# Patient Record
Sex: FEMALE | Race: WHITE | NOT HISPANIC OR LATINO | Employment: OTHER | URBAN - METROPOLITAN AREA
[De-identification: names, ages, dates, MRNs, and addresses within clinical notes are randomized per-mention and may not be internally consistent; named-entity substitution may affect disease eponyms.]

---

## 2023-06-08 NOTE — PROGRESS NOTES
Subjective   Elizabet Mitchell is a 77 y.o. female who presents for here for follow-up.  ADI Peñaloza is 77-year-old female with known history of dyslipidemia hypertension elevated BMI patient is here for a follow-up, voices no major medical pain here for fasting blood work, denies chest pain shortness of breath fever chills nausea vomiting constipation diarrhea dysuria urgency frequency. Rib cage pain  Review of Systems  10 system review pertinent as above  Objective     Visit Vitals  BP (!) 138/92   Pulse 78   Temp 37 °C (98.6 °F)   Resp 15      Physical Exam  HEENT: Atraumatic normocephalic the pupils are equal and round and reactive to light the sclerae nonicteric extraocular motion are intact.  Neck: Is supple without JVD no carotid bruits the trachea is midline there are no masses pulses are equal and bilateral with normal upstroke.  Skin: Normal.  Skin good texture.  Moist.  Good turgor.  No lesions, no rashes.  Lymph: No lymphadenopathy appreciated, no masses, no lesions  Lungs: Are clear to auscultation and percussion, good breath sounds bilaterally, no rhonchi, no wheezing, good diaphragmatic excursion.  Heart: Normal rate and normal rhythm S1, S2, no S3, no gallop, murmur or rub.  Abdomen: Soft, nontender, no organomegaly, good bowel sounds.    Extremities: Full range of motion, good pulses bilateral.  No cyanosis, no clubbing or edema.  Neuro: Cranial nerves II-XII are grossly intact there is no sensory or motor deficits.  Able to move all extremities.      Assessment/Plan     Patient is here for follow-up    Fasting blood works    CBC BMP lipids AST ALT vitamin D 25-hydroxy    Declined Mammogram and colonoscopy  06/13/2023    Hypertension elevated this am  Did not take her BP meds  No added salt diet, do not and salt to your food  Try to exercise every other day for 30 minutes  Continue current medications    Continue with the low-fat, low-cholesterol diet,  I recommended Mediterranean diet, which include  fish, chicken, vegetables and olive oil  Exercise daily for 30 minutes at least 3 times a week  Continue home medications    BMI elevated  Diet exercise weight loss    Neuropathy on lyrica   Doing well     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

## 2023-06-13 ENCOUNTER — OFFICE VISIT (OUTPATIENT)
Dept: PRIMARY CARE | Facility: CLINIC | Age: 78
End: 2023-06-13
Payer: MEDICARE

## 2023-06-13 VITALS
HEART RATE: 78 BPM | WEIGHT: 200 LBS | DIASTOLIC BLOOD PRESSURE: 92 MMHG | RESPIRATION RATE: 15 BRPM | TEMPERATURE: 98.6 F | HEIGHT: 60 IN | BODY MASS INDEX: 39.27 KG/M2 | SYSTOLIC BLOOD PRESSURE: 138 MMHG

## 2023-06-13 DIAGNOSIS — M94.0 COSTOCHONDRITIS: ICD-10-CM

## 2023-06-13 DIAGNOSIS — I10 BENIGN ESSENTIAL HTN: Primary | ICD-10-CM

## 2023-06-13 DIAGNOSIS — H26.9 CATARACT, UNSPECIFIED CATARACT TYPE, UNSPECIFIED LATERALITY: ICD-10-CM

## 2023-06-13 DIAGNOSIS — E78.5 DYSLIPIDEMIA: ICD-10-CM

## 2023-06-13 DIAGNOSIS — M16.10 PRIMARY OSTEOARTHRITIS OF HIP, UNSPECIFIED LATERALITY: ICD-10-CM

## 2023-06-13 DIAGNOSIS — Z98.818 OTHER DENTAL PROCEDURE STATUS: ICD-10-CM

## 2023-06-13 PROBLEM — M16.9 OSTEOARTHRITIS OF HIP: Status: ACTIVE | Noted: 2023-06-13

## 2023-06-13 PROCEDURE — 1159F MED LIST DOCD IN RCRD: CPT | Performed by: INTERNAL MEDICINE

## 2023-06-13 PROCEDURE — 1036F TOBACCO NON-USER: CPT | Performed by: INTERNAL MEDICINE

## 2023-06-13 PROCEDURE — 3075F SYST BP GE 130 - 139MM HG: CPT | Performed by: INTERNAL MEDICINE

## 2023-06-13 PROCEDURE — 80048 BASIC METABOLIC PNL TOTAL CA: CPT | Performed by: INTERNAL MEDICINE

## 2023-06-13 PROCEDURE — 80061 LIPID PANEL: CPT | Performed by: INTERNAL MEDICINE

## 2023-06-13 PROCEDURE — 3080F DIAST BP >= 90 MM HG: CPT | Performed by: INTERNAL MEDICINE

## 2023-06-13 PROCEDURE — 99214 OFFICE O/P EST MOD 30 MIN: CPT | Performed by: INTERNAL MEDICINE

## 2023-06-13 RX ORDER — AMOXICILLIN 500 MG/1
500 CAPSULE ORAL DAILY PRN
Qty: 6 CAPSULE | Refills: 1 | Status: SHIPPED | OUTPATIENT
Start: 2023-06-13 | End: 2023-06-25

## 2023-06-13 RX ORDER — TIMOLOL MALEATE 5 MG/ML
SOLUTION/ DROPS OPHTHALMIC
COMMUNITY
Start: 2022-10-09

## 2023-06-13 RX ORDER — LOTEPREDNOL ETABONATE 5 MG/ML
SUSPENSION/ DROPS OPHTHALMIC
COMMUNITY
Start: 2022-04-12 | End: 2023-10-10 | Stop reason: ALTCHOICE

## 2023-06-13 RX ORDER — TIZANIDINE 4 MG/1
TABLET ORAL
COMMUNITY
Start: 2023-01-15

## 2023-06-13 RX ORDER — LOSARTAN POTASSIUM 50 MG/1
1 TABLET ORAL DAILY
COMMUNITY
Start: 2022-07-28 | End: 2023-08-14 | Stop reason: SDUPTHER

## 2023-06-13 RX ORDER — HYDROCHLOROTHIAZIDE 25 MG/1
12.5 TABLET ORAL DAILY
COMMUNITY
End: 2024-05-03 | Stop reason: WASHOUT

## 2023-06-13 RX ORDER — TRAVOPROST OPHTHALMIC SOLUTION 0.04 MG/ML
SOLUTION OPHTHALMIC
COMMUNITY

## 2023-06-13 RX ORDER — PREGABALIN 50 MG/1
CAPSULE ORAL
COMMUNITY
Start: 2023-05-02

## 2023-06-13 ASSESSMENT — PAIN SCALES - GENERAL: PAINLEVEL: 0-NO PAIN

## 2023-06-14 ENCOUNTER — TELEPHONE (OUTPATIENT)
Dept: PRIMARY CARE | Facility: CLINIC | Age: 78
End: 2023-06-14
Payer: MEDICARE

## 2023-06-16 ENCOUNTER — TELEPHONE (OUTPATIENT)
Dept: PRIMARY CARE | Facility: CLINIC | Age: 78
End: 2023-06-16
Payer: MEDICARE

## 2023-08-14 DIAGNOSIS — I10 BENIGN ESSENTIAL HTN: Primary | ICD-10-CM

## 2023-08-14 RX ORDER — LOSARTAN POTASSIUM 50 MG/1
50 TABLET ORAL DAILY
Qty: 90 TABLET | Refills: 1 | Status: SHIPPED | OUTPATIENT
Start: 2023-08-14 | End: 2024-02-09 | Stop reason: SDUPTHER

## 2023-09-08 ENCOUNTER — TELEPHONE (OUTPATIENT)
Dept: PRIMARY CARE | Facility: CLINIC | Age: 78
End: 2023-09-08
Payer: MEDICARE

## 2023-09-08 DIAGNOSIS — M16.10 PRIMARY OSTEOARTHRITIS OF HIP, UNSPECIFIED LATERALITY: Primary | ICD-10-CM

## 2023-09-08 RX ORDER — AMOXICILLIN 500 MG/1
500 CAPSULE ORAL EVERY 8 HOURS SCHEDULED
Qty: 6 CAPSULE | Refills: 0 | Status: SHIPPED | OUTPATIENT
Start: 2023-09-08 | End: 2024-05-14 | Stop reason: SDUPTHER

## 2023-09-08 RX ORDER — AMOXICILLIN 500 MG/1
500 CAPSULE ORAL
COMMUNITY
Start: 2023-07-31 | End: 2023-09-08 | Stop reason: SDUPTHER

## 2023-10-08 NOTE — PROGRESS NOTES
Subjective   Elizabet Mitchell is a 78 y.o. female who presents for here for follow-up last seen June 13, 2023.  ADI Peñaloza is 77-year-old female with known history of dyslipidemia hypertension elevated BMI patient is here for a follow-up, voices no major medical pain here for fasting blood work, denies chest pain shortness of breath fever chills nausea vomiting constipation diarrhea dysuria urgency frequency.  Patient is here for follow-up and fasting blood work, neuropathy rt yodit cage band like patern extremely hypersensitive.   Review of Systems  10 system review pertinent as above  Objective     There were no vitals taken for this visit.     Physical Exam  HEENT: Atraumatic normocephalic the pupils are equal and round and reactive to light the sclerae nonicteric extraocular motion are intact.  Neck: Is supple without JVD no carotid bruits the trachea is midline there are no masses pulses are equal and bilateral with normal upstroke.  Skin: Normal.  Skin good texture.  Moist.  Good turgor.  No lesions, no rashes.  Lymph: No lymphadenopathy appreciated, no masses, no lesions  Lungs: Are clear to auscultation and percussion, good breath sounds bilaterally, no rhonchi, no wheezing, good diaphragmatic excursion.  Heart: Normal rate and normal rhythm S1, S2, no S3, no gallop, murmur or rub.  Abdomen: Soft, nontender, no organomegaly, good bowel sounds.    Extremities: Full range of motion, good pulses bilateral.  No cyanosis, no clubbing or edema.  Neuro: Cranial nerves II-XII are grossly intact there is no sensory or motor deficits.  Able to move all extremities.      Assessment/Plan     Patient is here for follow-up fasting blood work    CBC BMP lipids AST ALT vitamin D 25-hydroxy    Mammogram Declined  Colonoscopy Declined    Flu vaccine Declined  Pneumovax Declined   Patient wishes No other vaccines     Declined Mammogram and colonoscopy  06/13/2023 continues to decline    Hypertension  No added salt diet, do not  and salt to your food  Try to exercise every other day for 30 minutes  Continue current medications    Continue with the low-fat, low-cholesterol diet,  I recommended Mediterranean diet, which include fish, chicken, vegetables and olive oil  Exercise daily for 30 minutes at least 3 times a week  Continue home medications    Elevated BMI  Low-fat, low-cholesterol diet, exercise, daily  Ideal BMI is between 23 and 26 kg/m²  Low carbohydrate diet.    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

## 2023-10-10 ENCOUNTER — OFFICE VISIT (OUTPATIENT)
Dept: PRIMARY CARE | Facility: CLINIC | Age: 78
End: 2023-10-10
Payer: MEDICARE

## 2023-10-10 VITALS — BODY MASS INDEX: 39.27 KG/M2 | HEIGHT: 60 IN | WEIGHT: 200 LBS

## 2023-10-10 DIAGNOSIS — M16.10 PRIMARY OSTEOARTHRITIS OF HIP, UNSPECIFIED LATERALITY: ICD-10-CM

## 2023-10-10 DIAGNOSIS — I10 BENIGN ESSENTIAL HTN: Primary | ICD-10-CM

## 2023-10-10 DIAGNOSIS — B02.9 HERPES ZOSTER WITHOUT COMPLICATION: ICD-10-CM

## 2023-10-10 DIAGNOSIS — E55.9 VITAMIN D INSUFFICIENCY: ICD-10-CM

## 2023-10-10 DIAGNOSIS — E78.5 DYSLIPIDEMIA: ICD-10-CM

## 2023-10-10 DIAGNOSIS — H26.9 CATARACT, UNSPECIFIED CATARACT TYPE, UNSPECIFIED LATERALITY: ICD-10-CM

## 2023-10-10 PROCEDURE — 1036F TOBACCO NON-USER: CPT | Performed by: INTERNAL MEDICINE

## 2023-10-10 PROCEDURE — 84450 TRANSFERASE (AST) (SGOT): CPT | Performed by: INTERNAL MEDICINE

## 2023-10-10 PROCEDURE — 1159F MED LIST DOCD IN RCRD: CPT | Performed by: INTERNAL MEDICINE

## 2023-10-10 PROCEDURE — 80061 LIPID PANEL: CPT | Performed by: INTERNAL MEDICINE

## 2023-10-10 PROCEDURE — 1170F FXNL STATUS ASSESSED: CPT | Performed by: INTERNAL MEDICINE

## 2023-10-10 PROCEDURE — 80048 BASIC METABOLIC PNL TOTAL CA: CPT | Performed by: INTERNAL MEDICINE

## 2023-10-10 PROCEDURE — G0439 PPPS, SUBSEQ VISIT: HCPCS | Performed by: INTERNAL MEDICINE

## 2023-10-10 PROCEDURE — 1126F AMNT PAIN NOTED NONE PRSNT: CPT | Performed by: INTERNAL MEDICINE

## 2023-10-10 PROCEDURE — 82306 VITAMIN D 25 HYDROXY: CPT | Performed by: INTERNAL MEDICINE

## 2023-10-10 PROCEDURE — 84460 ALANINE AMINO (ALT) (SGPT): CPT | Performed by: INTERNAL MEDICINE

## 2023-10-10 PROCEDURE — 85025 COMPLETE CBC W/AUTO DIFF WBC: CPT | Performed by: INTERNAL MEDICINE

## 2023-10-10 RX ORDER — VALACYCLOVIR HYDROCHLORIDE 1 G/1
1000 TABLET, FILM COATED ORAL 2 TIMES DAILY
Qty: 20 TABLET | Refills: 0 | Status: SHIPPED | OUTPATIENT
Start: 2023-10-10 | End: 2023-10-23 | Stop reason: SDUPTHER

## 2023-10-10 ASSESSMENT — ENCOUNTER SYMPTOMS
DEPRESSION: 0
LOSS OF SENSATION IN FEET: 0
OCCASIONAL FEELINGS OF UNSTEADINESS: 0

## 2023-10-12 ASSESSMENT — ACTIVITIES OF DAILY LIVING (ADL)
TAKING MEDICATION: INDEPENDENT
PREPARING MEALS: INDEPENDENT
JUDGMENT_ADEQUATE_SAFELY_COMPLETE_DAILY_ACTIVITIES: YES
GROOMING: INDEPENDENT
PATIENT'S MEMORY ADEQUATE TO SAFELY COMPLETE DAILY ACTIVITIES?: YES
MANAGING FINANCES: INDEPENDENT
EATING: INDEPENDENT
NEEDS ASSISTANCE WITH FOOD: INDEPENDENT
WALKS IN HOME: INDEPENDENT
DRESSING YOURSELF: INDEPENDENT
STIL DRIVING: NO
USING TRANSPORTATION: INDEPENDENT
USING TELEPHONE: INDEPENDENT
DOING HOUSEWORK: INDEPENDENT
ADEQUATE_TO_COMPLETE_ADL: YES
PILL BOX USED: NO
HEARING - RIGHT EAR: FUNCTIONAL
FEEDING YOURSELF: INDEPENDENT
GROCERY SHOPPING: INDEPENDENT
HEARING - LEFT EAR: FUNCTIONAL
BATHING: INDEPENDENT
TOILETING: INDEPENDENT

## 2023-10-12 ASSESSMENT — PATIENT HEALTH QUESTIONNAIRE - PHQ9
1. LITTLE INTEREST OR PLEASURE IN DOING THINGS: NOT AT ALL
2. FEELING DOWN, DEPRESSED OR HOPELESS: NOT AT ALL
SUM OF ALL RESPONSES TO PHQ9 QUESTIONS 1 AND 2: 0

## 2023-10-12 ASSESSMENT — ANXIETY QUESTIONNAIRES
4. TROUBLE RELAXING: NOT AT ALL
7. FEELING AFRAID AS IF SOMETHING AWFUL MIGHT HAPPEN: NOT AT ALL
GAD7 TOTAL SCORE: 3
1. FEELING NERVOUS, ANXIOUS, OR ON EDGE: NOT AT ALL
2. NOT BEING ABLE TO STOP OR CONTROL WORRYING: SEVERAL DAYS
3. WORRYING TOO MUCH ABOUT DIFFERENT THINGS: SEVERAL DAYS
IF YOU CHECKED OFF ANY PROBLEMS ON THIS QUESTIONNAIRE, HOW DIFFICULT HAVE THESE PROBLEMS MADE IT FOR YOU TO DO YOUR WORK, TAKE CARE OF THINGS AT HOME, OR GET ALONG WITH OTHER PEOPLE: SOMEWHAT DIFFICULT
6. BECOMING EASILY ANNOYED OR IRRITABLE: SEVERAL DAYS
5. BEING SO RESTLESS THAT IT IS HARD TO SIT STILL: NOT AT ALL

## 2023-10-12 ASSESSMENT — GERIATRIC MINI NUTRITIONAL ASSESSMENT (MNA)
A HAS FOOD INTAKE DECLINED OVER THE PAST 3 MONTHS DUE TO LOSS OF APPETITE, DIGESTIVE PROBLEMS, CHEWING OR SWALLOWING DIFFICULTIES?: NO DECREASE IN FOOD INTAKE
B WEIGHT LOSS DURING THE LAST 3 MONTHS: NO WEIGHT LOSS
C GENERAL MOBILITY: GOES OUT

## 2023-10-12 ASSESSMENT — COLUMBIA-SUICIDE SEVERITY RATING SCALE - C-SSRS
2. HAVE YOU ACTUALLY HAD ANY THOUGHTS OF KILLING YOURSELF?: NO
6. HAVE YOU EVER DONE ANYTHING, STARTED TO DO ANYTHING, OR PREPARED TO DO ANYTHING TO END YOUR LIFE?: NO
1. IN THE PAST MONTH, HAVE YOU WISHED YOU WERE DEAD OR WISHED YOU COULD GO TO SLEEP AND NOT WAKE UP?: NO

## 2023-10-12 NOTE — PROGRESS NOTES
Subjective   Reason for Visit: Elizabet Mitchell is an 78 y.o. female here for a Medicare Wellness visit.               HPI  Elizabet is a 78 female lives at home independent reports no history of falling takes the medication as prescribed denies fever chills nausea vomiting constipation diarrhea dysuria urgency frequency  Patient Care Team:  Scooby Morales MD as PCP - General  Scooby Morales MD as PCP - [facility] Attributed Provider     Review of Systems  10 system review pertinent as above  Objective   Vitals:  Ht 1.524 m (5')   Wt 90.7 kg (200 lb)   BMI 39.06 kg/m²       Physical Exam  HEENT: Atraumatic normocephalic the pupils are equal and round and reactive to light the sclerae nonicteric extraocular motion are intact.  Neck: Is supple without JVD no carotid bruits the trachea is midline there are no masses pulses are equal and bilateral with normal upstroke.  Skin: Normal.  Skin good texture.  Moist.  Good turgor.  No lesions, no rashes.  Lymph: No lymphadenopathy appreciated, no masses, no lesions  Lungs: Are clear to auscultation and percussion, good breath sounds bilaterally, no rhonchi, no wheezing, good diaphragmatic excursion.  Heart: Normal rate and normal rhythm S1, S2, no S3, no gallop, murmur or rub.  Abdomen: Soft, nontender, no organomegaly, good bowel sounds.    Extremities: Full range of motion, good pulses bilateral.  No cyanosis, no clubbing or edema.  Neuro: Cranial nerves II-XII are grossly intact there is no sensory or motor deficits.  Able to move all extremities.  Assessment/Plan   Medicare wellness  Problem List Items Addressed This Visit       Benign essential HTN - Primary    Relevant Orders    Basic Metabolic Panel (Completed)    Cataract    Osteoarthritis of hip    BMI 39.0-39.9,adult    Relevant Orders    Basic Metabolic Panel (Completed)    Herpes zoster without complication    Relevant Medications    valACYclovir (Valtrex) 1 gram tablet    Other Relevant Orders    CBC (Completed)      Other Visit Diagnoses       Dyslipidemia        Relevant Orders    CBC (Completed)    Lipid Panel (Completed)    AST (Completed)    ALT (Completed)    Vitamin D insufficiency        Relevant Orders    Vitamin D 25-Hydroxy,Total (for eval of Vitamin D levels) (Completed)

## 2023-10-23 ENCOUNTER — TELEPHONE (OUTPATIENT)
Dept: PRIMARY CARE | Facility: CLINIC | Age: 78
End: 2023-10-23
Payer: MEDICARE

## 2023-10-23 DIAGNOSIS — B02.9 HERPES ZOSTER WITHOUT COMPLICATION: ICD-10-CM

## 2023-10-23 RX ORDER — VALACYCLOVIR HYDROCHLORIDE 1 G/1
1000 TABLET, FILM COATED ORAL 2 TIMES DAILY
Qty: 20 TABLET | Refills: 1 | Status: SHIPPED | OUTPATIENT
Start: 2023-10-23 | End: 2023-11-14 | Stop reason: SDUPTHER

## 2023-10-23 NOTE — TELEPHONE ENCOUNTER
Pt called to give an update, the rx for valcyclovir has taken away her pain but she still has the burning on her rib from the shingles

## 2023-11-14 ENCOUNTER — TELEPHONE (OUTPATIENT)
Dept: PRIMARY CARE | Facility: CLINIC | Age: 78
End: 2023-11-14
Payer: MEDICARE

## 2023-11-14 DIAGNOSIS — B02.9 HERPES ZOSTER WITHOUT COMPLICATION: ICD-10-CM

## 2023-11-14 RX ORDER — VALACYCLOVIR HYDROCHLORIDE 1 G/1
1000 TABLET, FILM COATED ORAL 2 TIMES DAILY
Qty: 20 TABLET | Refills: 1 | Status: SHIPPED | OUTPATIENT
Start: 2023-11-14 | End: 2023-12-04

## 2023-11-14 NOTE — TELEPHONE ENCOUNTER
Pt almost completed her vacyclovir rx, she still has burning around her rib cage area, she want to know if she should do another course of valcyclovir.

## 2023-11-30 ENCOUNTER — TELEPHONE (OUTPATIENT)
Dept: PRIMARY CARE | Facility: CLINIC | Age: 78
End: 2023-11-30
Payer: MEDICARE

## 2023-11-30 NOTE — TELEPHONE ENCOUNTER
Has 4 days left of her valcyclovir medication, she states her shingles is getting worse, she has a rash under her breast. Need advice on what to do.

## 2023-12-12 ENCOUNTER — APPOINTMENT (OUTPATIENT)
Dept: PRIMARY CARE | Facility: CLINIC | Age: 78
End: 2023-12-12
Payer: MEDICARE

## 2024-02-09 DIAGNOSIS — I10 BENIGN ESSENTIAL HTN: ICD-10-CM

## 2024-02-09 RX ORDER — LOSARTAN POTASSIUM 50 MG/1
50 TABLET ORAL DAILY
Qty: 90 TABLET | Refills: 1 | Status: SHIPPED | OUTPATIENT
Start: 2024-02-09 | End: 2024-05-03 | Stop reason: ALTCHOICE

## 2024-04-11 ENCOUNTER — APPOINTMENT (OUTPATIENT)
Dept: PRIMARY CARE | Facility: CLINIC | Age: 79
End: 2024-04-11
Payer: MEDICARE

## 2024-04-30 NOTE — PROGRESS NOTES
Subjective   Elizabet Mitchell is a 78 y.o. female who presents for here for follow-up last seen June 13, 2023.  ADI Peñaloza is 77-year-old female with known history of dyslipidemia hypertension elevated BMI patient is here for a follow-up, voices no major medical pain here for fasting blood work, denies chest pain shortness of breath fever chills nausea vomiting constipation diarrhea dysuria urgency frequency.  Patient is here for follow-up and fasting blood work, neuropathy rt yodit cage band like patern extremely hypersensitive.   Review of Systems  10 system review pertinent as above  Objective     Visit Vitals  /82   Pulse 78   Temp 36.4 °C (97.5 °F)   Resp 16        Physical Exam  HEENT: Atraumatic normocephalic the pupils are equal and round and reactive to light the sclerae nonicteric extraocular motion are intact.  Neck: Is supple without JVD no carotid bruits the trachea is midline there are no masses pulses are equal and bilateral with normal upstroke.  Skin: Normal.  Skin good texture.  Moist.  Good turgor.  No lesions, no rashes.  Lymph: No lymphadenopathy appreciated, no masses, no lesions  Lungs: Are clear to auscultation and percussion, good breath sounds bilaterally, no rhonchi, no wheezing, good diaphragmatic excursion.  Heart: Normal rate and normal rhythm S1, S2, no S3, no gallop, murmur or rub.  Abdomen: Soft, nontender, no organomegaly, good bowel sounds.    Extremities: Full range of motion, good pulses bilateral.  No cyanosis, no clubbing or edema.  Neuro: Cranial nerves II-XII are grossly intact there is no sensory or motor deficits.  Able to move all extremities.      Assessment/Plan     Patient is here for follow-up fasting blood work    CBC BMP lipids AST ALT vitamin D 25-hydroxy    Mammogram cont Declined (05/03/24)  Colonoscopy Cont Declined (05/03/24)    Flu vaccine Declined  Pneumovax Declined   Patient wishes No other vaccines     Declined Mammogram and colonoscopy  06/13/2023  continues to decline    Hypertension  No added salt diet, do not and salt to your food  Try to exercise every other day for 30 minutes  Continue current medications    Continue with the low-fat, low-cholesterol diet,  I recommended Mediterranean diet, which include fish, chicken, vegetables and olive oil  Exercise daily for 30 minutes at least 3 times a week  Continue home medications    Elevated BMI  Low-fat, low-cholesterol diet, exercise, daily  Ideal BMI is between 23 and 26 kg/m²  Low carbohydrate diet.    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

## 2024-05-03 ENCOUNTER — OFFICE VISIT (OUTPATIENT)
Dept: PRIMARY CARE | Facility: CLINIC | Age: 79
End: 2024-05-03
Payer: MEDICARE

## 2024-05-03 VITALS
RESPIRATION RATE: 16 BRPM | HEART RATE: 78 BPM | BODY MASS INDEX: 38.09 KG/M2 | HEIGHT: 60 IN | DIASTOLIC BLOOD PRESSURE: 82 MMHG | WEIGHT: 194 LBS | TEMPERATURE: 97.5 F | SYSTOLIC BLOOD PRESSURE: 120 MMHG

## 2024-05-03 DIAGNOSIS — M16.10 PRIMARY OSTEOARTHRITIS OF HIP, UNSPECIFIED LATERALITY: ICD-10-CM

## 2024-05-03 DIAGNOSIS — E66.01 MORBID OBESITY (MULTI): ICD-10-CM

## 2024-05-03 DIAGNOSIS — I10 BENIGN ESSENTIAL HTN: ICD-10-CM

## 2024-05-03 DIAGNOSIS — E78.5 DYSLIPIDEMIA: ICD-10-CM

## 2024-05-03 DIAGNOSIS — Z00.00 ROUTINE GENERAL MEDICAL EXAMINATION AT HEALTH CARE FACILITY: Primary | ICD-10-CM

## 2024-05-03 DIAGNOSIS — I10 PRIMARY HYPERTENSION: ICD-10-CM

## 2024-05-03 PROCEDURE — 1126F AMNT PAIN NOTED NONE PRSNT: CPT | Performed by: INTERNAL MEDICINE

## 2024-05-03 PROCEDURE — 3074F SYST BP LT 130 MM HG: CPT | Performed by: INTERNAL MEDICINE

## 2024-05-03 PROCEDURE — 1036F TOBACCO NON-USER: CPT | Performed by: INTERNAL MEDICINE

## 2024-05-03 PROCEDURE — 80048 BASIC METABOLIC PNL TOTAL CA: CPT | Performed by: INTERNAL MEDICINE

## 2024-05-03 PROCEDURE — 1159F MED LIST DOCD IN RCRD: CPT | Performed by: INTERNAL MEDICINE

## 2024-05-03 PROCEDURE — 80061 LIPID PANEL: CPT | Performed by: INTERNAL MEDICINE

## 2024-05-03 PROCEDURE — 3079F DIAST BP 80-89 MM HG: CPT | Performed by: INTERNAL MEDICINE

## 2024-05-03 PROCEDURE — 84460 ALANINE AMINO (ALT) (SGPT): CPT | Performed by: INTERNAL MEDICINE

## 2024-05-03 PROCEDURE — 85025 COMPLETE CBC W/AUTO DIFF WBC: CPT | Performed by: INTERNAL MEDICINE

## 2024-05-03 PROCEDURE — 99214 OFFICE O/P EST MOD 30 MIN: CPT | Performed by: INTERNAL MEDICINE

## 2024-05-03 PROCEDURE — 84450 TRANSFERASE (AST) (SGOT): CPT | Performed by: INTERNAL MEDICINE

## 2024-05-03 PROCEDURE — 1170F FXNL STATUS ASSESSED: CPT | Performed by: INTERNAL MEDICINE

## 2024-05-03 RX ORDER — LOSARTAN POTASSIUM AND HYDROCHLOROTHIAZIDE 12.5; 5 MG/1; MG/1
1 TABLET ORAL DAILY
Qty: 90 TABLET | Refills: 3 | Status: SHIPPED | OUTPATIENT
Start: 2024-05-03 | End: 2025-05-03

## 2024-05-03 ASSESSMENT — ANXIETY QUESTIONNAIRES
7. FEELING AFRAID AS IF SOMETHING AWFUL MIGHT HAPPEN: NOT AT ALL
2. NOT BEING ABLE TO STOP OR CONTROL WORRYING: NOT AT ALL
4. TROUBLE RELAXING: NOT AT ALL
3. WORRYING TOO MUCH ABOUT DIFFERENT THINGS: NOT AT ALL
GAD7 TOTAL SCORE: 0
6. BECOMING EASILY ANNOYED OR IRRITABLE: NOT AT ALL
1. FEELING NERVOUS, ANXIOUS, OR ON EDGE: NOT AT ALL
IF YOU CHECKED OFF ANY PROBLEMS ON THIS QUESTIONNAIRE, HOW DIFFICULT HAVE THESE PROBLEMS MADE IT FOR YOU TO DO YOUR WORK, TAKE CARE OF THINGS AT HOME, OR GET ALONG WITH OTHER PEOPLE: NOT DIFFICULT AT ALL
5. BEING SO RESTLESS THAT IT IS HARD TO SIT STILL: NOT AT ALL

## 2024-05-03 ASSESSMENT — ACTIVITIES OF DAILY LIVING (ADL)
MANAGING FINANCES: INDEPENDENT
PREPARING MEALS: INDEPENDENT
DOING HOUSEWORK: INDEPENDENT
TOILETING: INDEPENDENT
WALKS IN HOME: INDEPENDENT
HEARING - RIGHT EAR: FUNCTIONAL
NEEDS ASSISTANCE WITH FOOD: INDEPENDENT
DRESSING YOURSELF: INDEPENDENT
USING TELEPHONE: INDEPENDENT
TAKING MEDICATION: INDEPENDENT
STIL DRIVING: YES
USING TRANSPORTATION: INDEPENDENT
HEARING - LEFT EAR: FUNCTIONAL
BATHING: INDEPENDENT
GROOMING: INDEPENDENT
PATIENT'S MEMORY ADEQUATE TO SAFELY COMPLETE DAILY ACTIVITIES?: YES
FEEDING YOURSELF: INDEPENDENT
GROCERY SHOPPING: INDEPENDENT
JUDGMENT_ADEQUATE_SAFELY_COMPLETE_DAILY_ACTIVITIES: YES
PILL BOX USED: NO
EATING: INDEPENDENT
ADEQUATE_TO_COMPLETE_ADL: YES

## 2024-05-03 ASSESSMENT — PATIENT HEALTH QUESTIONNAIRE - PHQ9
SUM OF ALL RESPONSES TO PHQ9 QUESTIONS 1 AND 2: 0
1. LITTLE INTEREST OR PLEASURE IN DOING THINGS: NOT AT ALL
2. FEELING DOWN, DEPRESSED OR HOPELESS: NOT AT ALL

## 2024-05-03 ASSESSMENT — ENCOUNTER SYMPTOMS
OCCASIONAL FEELINGS OF UNSTEADINESS: 0
DEPRESSION: 0
LOSS OF SENSATION IN FEET: 0

## 2024-05-03 ASSESSMENT — GERIATRIC MINI NUTRITIONAL ASSESSMENT (MNA)
B WEIGHT LOSS DURING THE LAST 3 MONTHS: NO WEIGHT LOSS
E NEUROPSYCHOLOGICAL PROBLEMS: NO PSYCHOLOGICAL PROBLEMS
C GENERAL MOBILITY: GOES OUT

## 2024-05-03 ASSESSMENT — COLUMBIA-SUICIDE SEVERITY RATING SCALE - C-SSRS
6. HAVE YOU EVER DONE ANYTHING, STARTED TO DO ANYTHING, OR PREPARED TO DO ANYTHING TO END YOUR LIFE?: NO
2. HAVE YOU ACTUALLY HAD ANY THOUGHTS OF KILLING YOURSELF?: NO
1. IN THE PAST MONTH, HAVE YOU WISHED YOU WERE DEAD OR WISHED YOU COULD GO TO SLEEP AND NOT WAKE UP?: NO

## 2024-05-03 ASSESSMENT — PAIN SCALES - GENERAL: PAINLEVEL: 0-NO PAIN

## 2024-05-03 NOTE — PROGRESS NOTES
Subjective   Reason for Visit: Elizabet Mitchell is an 78 y.o. female here for a Medicare Wellness visit.          Reviewed all medications by prescribing practitioner or clinical pharmacist (such as prescriptions, OTCs, herbal therapies and supplements) and documented in the medical record.    HPI    Patient Care Team:  Scooby Morales MD as PCP - General  Scooby Morales MD as PCP - [facility]     Review of Systems    Objective   Vitals:  /82   Pulse 78   Temp 36.4 °C (97.5 °F)   Resp 16   Ht 1.524 m (5')   Wt 88 kg (194 lb)   BMI 37.89 kg/m²       Physical Exam    Assessment/Plan   Problem List Items Addressed This Visit     Benign essential HTN    Relevant Medications    losartan-hydrochlorothiazide (Hyzaar) 50-12.5 mg tablet    Osteoarthritis of hip    Relevant Orders    CBC    BMI 39.0-39.9,adult    HTN (hypertension)    Relevant Orders    CBC    Basic Metabolic Panel    Morbid obesity (Multi)   Other Visit Diagnoses     Routine general medical examination at health care facility    -  Primary    Dyslipidemia        Relevant Orders    Lipid Panel    AST    ALT

## 2024-05-03 NOTE — PROGRESS NOTES
Subjective   Reason for Visit: Elizabet Mitchell is an 78 y.o. female here for a Medicare Wellness visit. Reviewed all medications by prescribing practitioner or clinical pharmacist (such as prescriptions, OTCs, herbal therapies and supplements) and documented in the medical record.    HPI  Elizabet is 78 she is self-sufficient lives at home reports no falling she follows with pain management from time to time post cervicalgia has been doing well for the last 2 years denies fever chills cough nausea vomiting reports no falling.  Patient Care Team:  Scooby Morales MD as PCP - General  Scooby Morales MD as PCP - [facility] Attributed Provider     Review of Systems  10 system are pertinent as above  Objective   Vitals:  /82   Pulse 78   Temp 36.4 °C (97.5 °F)   Resp 16   Ht 1.524 m (5')   Wt 88 kg (194 lb)   BMI 37.89 kg/m²       Physical Exam  HEENT: Atraumatic normocephalic the pupils are equal and round and reactive to light the sclerae nonicteric extraocular motion are intact.  Neck: Is supple without JVD no carotid bruits the trachea is midline there are no masses pulses are equal and bilateral with normal upstroke.  Skin: Normal.  Skin good texture.  Moist.  Good turgor.  No lesions, no rashes.  Lymph: No lymphadenopathy appreciated, no masses, no lesions  Lungs: Are clear to auscultation and percussion, good breath sounds bilaterally, no rhonchi, no wheezing, good diaphragmatic excursion.  Heart: Normal rate and normal rhythm S1, S2, no S3, no gallop, murmur or rub.  Abdomen: Soft, nontender, no organomegaly, good bowel sounds.    Extremities: Full range of motion, good pulses bilateral.  No cyanosis, no clubbing or edema.  Neuro: Cranial nerves II-XII are grossly intact there is no sensory or motor deficits.  Able to move all extremities.  Assessment/Plan   Medicare wellness  Problem List Items Addressed This Visit       Benign essential HTN    Relevant Medications    losartan-hydrochlorothiazide (Hyzaar)  50-12.5 mg tablet    Osteoarthritis of hip    Relevant Orders    CBC    BMI 39.0-39.9,adult    HTN (hypertension) - Primary    Relevant Orders    CBC    Basic Metabolic Panel    Morbid obesity (Multi)     Other Visit Diagnoses       Dyslipidemia        Relevant Orders    Lipid Panel    AST    ALT

## 2024-05-14 ENCOUNTER — TELEPHONE (OUTPATIENT)
Dept: PRIMARY CARE | Facility: CLINIC | Age: 79
End: 2024-05-14
Payer: MEDICARE

## 2024-05-14 DIAGNOSIS — M16.10 PRIMARY OSTEOARTHRITIS OF HIP, UNSPECIFIED LATERALITY: ICD-10-CM

## 2024-05-14 RX ORDER — AMOXICILLIN 500 MG/1
500 CAPSULE ORAL EVERY 8 HOURS SCHEDULED
Qty: 6 CAPSULE | Refills: 0 | Status: SHIPPED | OUTPATIENT
Start: 2024-05-14

## 2024-05-14 NOTE — TELEPHONE ENCOUNTER
Pt is requesting a rx for an antibiotic, she states she need it before her dentist appointment, please send rx to [facility]

## 2024-07-29 ENCOUNTER — TELEPHONE (OUTPATIENT)
Dept: PRIMARY CARE | Facility: CLINIC | Age: 79
End: 2024-07-29
Payer: MEDICARE

## 2024-07-29 DIAGNOSIS — M16.10 PRIMARY OSTEOARTHRITIS OF HIP, UNSPECIFIED LATERALITY: ICD-10-CM

## 2024-07-29 RX ORDER — AMOXICILLIN 500 MG/1
500 CAPSULE ORAL EVERY 8 HOURS SCHEDULED
Qty: 6 CAPSULE | Refills: 0 | Status: SHIPPED | OUTPATIENT
Start: 2024-07-29

## 2024-08-16 ENCOUNTER — TELEPHONE (OUTPATIENT)
Dept: PRIMARY CARE | Facility: CLINIC | Age: 79
End: 2024-08-16
Payer: MEDICARE

## 2024-11-07 ENCOUNTER — APPOINTMENT (OUTPATIENT)
Dept: PRIMARY CARE | Facility: CLINIC | Age: 79
End: 2024-11-07
Payer: MEDICARE

## 2024-11-21 NOTE — PROGRESS NOTES
Subjective   Elizabet Mitchell is a 79 y.o. female who presents for here for follow-up and Medicare wellness.  HPI  Jh is 77-year-old female with known history of dyslipidemia hypertension elevated BMI patient is here for a follow-up, voices no major medical pain here for fasting blood work, denies chest pain shortness of breath fever chills nausea vomiting constipation diarrhea dysuria urgency frequency.  Patient is here for follow-up and fasting blood work, neuropathy rt yodit cage band like patern extremely hypersensitive.   Review of Systems  10 system review pertinent as above  Objective     Visit Vitals  /82   Pulse 78   Temp 36.7 °C (98.1 °F) (Temporal)   Resp 14          Physical Exam  HEENT: Atraumatic normocephalic the pupils are equal and round and reactive to light the sclerae nonicteric extraocular motion are intact.  Neck: Is supple without JVD no carotid bruits the trachea is midline there are no masses pulses are equal and bilateral with normal upstroke.  Skin: Normal.  Skin good texture.  Moist.  Good turgor.  No lesions, no rashes.  Lymph: No lymphadenopathy appreciated, no masses, no lesions  Lungs: Are clear to auscultation and percussion, good breath sounds bilaterally, no rhonchi, no wheezing, good diaphragmatic excursion.  Heart: Normal rate and normal rhythm S1, S2, no S3, no gallop, murmur or rub.  Abdomen: Soft, nontender, no organomegaly, good bowel sounds.    Extremities: Full range of motion, good pulses bilateral.  No cyanosis, no clubbing or edema.  Neuro: Cranial nerves II-XII are grossly intact there is no sensory or motor deficits.  Able to move all extremities.      Assessment/Plan     Patient is here for follow-up fasting blood work    CBC BMP lipids AST ALT vitamin D 25-hydroxy    Prevention  Mammogram cont Declined (05/03/24 (11/26/24))  Colonoscopy Cont Declined (05/03/24)(11/26/24)    Immunization  Flu vaccine Declined  Pneumovax 11/26/24   Patient wishes No other  vaccines   Craig summer 02/2023  Shingles declined    One dark mole circular and symmetric rt upper arm  Will refer to dermatology  Patient wants to see her own dermatologist Dr. Shannon    Hypertension  No added salt diet, do not and salt to your food  Try to exercise every other day for 30 minutes  Continue current medications    Continue with the low-fat, low-cholesterol diet,  I recommended Mediterranean diet, which include fish, chicken, vegetables and olive oil  Exercise daily for 30 minutes at least 3 times a week  Continue home medications    Elevated BMI 38.67 kg/meter sq  Low-fat, low-cholesterol diet, exercise, daily  Ideal BMI is between 23 and 26 kg/m²  Low carbohydrate diet.    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  Exercise range of motion strengthening      Problem List Items Addressed This Visit       Benign essential HTN - Primary       Orders:    Basic Metabolic Panel           Relevant Orders    Basic Metabolic Panel    Osteoarthritis of hip    Relevant Medications    amoxicillin (Amoxil) 500 mg capsule    BMI 39.0-39.9,adult       Orders:    CBC w/5 Part Differential, [facility]           Relevant Orders    CBC w/5 Part Differential, [facility]    HTN (hypertension)                   Morbid obesity (Multi)       Orders:    CBC w/5 Part Differential, [facility]           Relevant Orders    CBC w/5 Part Differential, [facility]    Class 2 obesity due to excess calories without serious comorbidity with body mass index (BMI) of 39.0 to 39.9 in adult       Orders:    CBC w/5 Part Differential, [facility]           Relevant Orders    CBC w/5 Part Differential, [facility]     Other Visit Diagnoses       Dyslipidemia        Relevant Orders    Lipid Panel    AST    ALT    Change in mole        Relevant Orders    Referral to Dermatology              Scooby  MD Carmen

## 2024-11-26 ENCOUNTER — TELEPHONE (OUTPATIENT)
Dept: PRIMARY CARE | Facility: CLINIC | Age: 79
End: 2024-11-26

## 2024-11-26 ENCOUNTER — APPOINTMENT (OUTPATIENT)
Dept: PRIMARY CARE | Facility: CLINIC | Age: 79
End: 2024-11-26
Payer: MEDICARE

## 2024-11-26 VITALS
DIASTOLIC BLOOD PRESSURE: 82 MMHG | HEART RATE: 78 BPM | HEIGHT: 60 IN | SYSTOLIC BLOOD PRESSURE: 128 MMHG | WEIGHT: 198 LBS | RESPIRATION RATE: 14 BRPM | TEMPERATURE: 98.1 F | BODY MASS INDEX: 38.87 KG/M2

## 2024-11-26 DIAGNOSIS — M16.10 PRIMARY OSTEOARTHRITIS OF HIP, UNSPECIFIED LATERALITY: ICD-10-CM

## 2024-11-26 DIAGNOSIS — E78.5 DYSLIPIDEMIA: ICD-10-CM

## 2024-11-26 DIAGNOSIS — E66.01 MORBID OBESITY (MULTI): ICD-10-CM

## 2024-11-26 DIAGNOSIS — I10 BENIGN ESSENTIAL HTN: Primary | ICD-10-CM

## 2024-11-26 DIAGNOSIS — I10 PRIMARY HYPERTENSION: ICD-10-CM

## 2024-11-26 DIAGNOSIS — D22.9 CHANGE IN MOLE: ICD-10-CM

## 2024-11-26 DIAGNOSIS — E66.09 CLASS 2 OBESITY DUE TO EXCESS CALORIES WITHOUT SERIOUS COMORBIDITY WITH BODY MASS INDEX (BMI) OF 39.0 TO 39.9 IN ADULT: ICD-10-CM

## 2024-11-26 DIAGNOSIS — E66.812 CLASS 2 OBESITY DUE TO EXCESS CALORIES WITHOUT SERIOUS COMORBIDITY WITH BODY MASS INDEX (BMI) OF 39.0 TO 39.9 IN ADULT: ICD-10-CM

## 2024-11-26 LAB
ALT SERPL W P-5'-P-CCNC: 23 U/L (ref 16–63)
ANION GAP SERPL CALC-SCNC: 16 MMOL/L (ref 10–20)
AST SERPL W P-5'-P-CCNC: 25 U/L (ref 15–37)
BASOPHILS # BLD AUTO: 0.03 X10*3/UL (ref 0.1–1.6)
BASOPHILS NFR BLD AUTO: 0.34 % (ref 0–0.3)
BUN SERPL-MCNC: 33 MG/DL (ref 7–18)
CALCIUM SERPL-MCNC: 9.6 MG/DL (ref 8.5–10.1)
CHLORIDE SERPL-SCNC: 101 MMOL/L (ref 98–107)
CHOLEST SERPL-MCNC: 180 MG/DL (ref 0–199)
CHOLESTEROL/HDL RATIO: 2.7 (ref 4.2–7)
CO2 SERPL-SCNC: 25 MMOL/L (ref 21–32)
CREAT SERPL-MCNC: 1.7 MG/DL (ref 0.6–1.1)
EGFRCR SERPLBLD CKD-EPI 2021: 31 ML/MIN/1.73M*2
EOSINOPHIL # BLD AUTO: 0.19 X10*3/UL (ref 0.04–0.5)
EOSINOPHIL NFR BLD AUTO: 1.97 % (ref 0.7–7)
ERYTHROCYTE [DISTWIDTH] IN BLOOD BY AUTOMATED COUNT: 13.8 % (ref 11.5–14.5)
GLUCOSE SERPL-MCNC: 92 MG/DL (ref 74–100)
HCT VFR BLD AUTO: 42.3 % (ref 36.6–46.6)
HDLC SERPL-MCNC: 66 MG/DL (ref 40–59)
HGB BLD-MCNC: 14.36 G/DL (ref 12–15.4)
IS PATIENT FASTING: YES
LDLC SERPL DIRECT ASSAY-MCNC: 105 MG/DL (ref 0–100)
LYMPHOCYTES # BLD AUTO: 2.14 X10*3/UL (ref 0–6)
LYMPHOCYTES NFR BLD AUTO: 21.87 % (ref 20.5–51.1)
MCH RBC QN AUTO: 31.3 PG (ref 26–32)
MCHC RBC AUTO-ENTMCNC: 33.9 G/DL (ref 31–38)
MCV RBC AUTO: 92.2 FL (ref 80–96)
MONOCYTES # BLD AUTO: 0.83 X10*3/UL (ref 1.6–24.9)
MONOCYTES NFR BLD AUTO: 8.47 % (ref 1.7–9.3)
NEUTROPHILS # BLD AUTO: 6.58 X10*3/UL (ref 1.4–6.5)
NEUTROPHILS NFR BLD AUTO: 67.35 % (ref 42.2–75.2)
PLATELET # BLD AUTO: 381.7 X10*3/UL (ref 150–450)
PMV BLD AUTO: 7.83 FL (ref 7.8–11)
POTASSIUM SERPL-SCNC: 5.1 MMOL/L (ref 3.5–5.1)
RBC # BLD AUTO: 4.59 X10*6/UL (ref 3.9–5.3)
SODIUM SERPL-SCNC: 137 MMOL/L (ref 136–145)
TRIGL SERPL-MCNC: 82 MG/DL
WBC # BLD AUTO: 9.77 X10*3/UL (ref 4.5–10.5)

## 2024-11-26 PROCEDURE — G0446 INTENS BEHAVE THER CARDIO DX: HCPCS | Performed by: INTERNAL MEDICINE

## 2024-11-26 PROCEDURE — 3079F DIAST BP 80-89 MM HG: CPT | Performed by: INTERNAL MEDICINE

## 2024-11-26 PROCEDURE — 84460 ALANINE AMINO (ALT) (SGPT): CPT | Performed by: INTERNAL MEDICINE

## 2024-11-26 PROCEDURE — 80061 LIPID PANEL: CPT | Performed by: INTERNAL MEDICINE

## 2024-11-26 PROCEDURE — 85025 COMPLETE CBC W/AUTO DIFF WBC: CPT | Performed by: INTERNAL MEDICINE

## 2024-11-26 PROCEDURE — 1125F AMNT PAIN NOTED PAIN PRSNT: CPT | Performed by: INTERNAL MEDICINE

## 2024-11-26 PROCEDURE — G0447 BEHAVIOR COUNSEL OBESITY 15M: HCPCS | Performed by: INTERNAL MEDICINE

## 2024-11-26 PROCEDURE — 3074F SYST BP LT 130 MM HG: CPT | Performed by: INTERNAL MEDICINE

## 2024-11-26 PROCEDURE — G0442 ANNUAL ALCOHOL SCREEN 15 MIN: HCPCS | Performed by: INTERNAL MEDICINE

## 2024-11-26 PROCEDURE — G0009 ADMIN PNEUMOCOCCAL VACCINE: HCPCS | Performed by: INTERNAL MEDICINE

## 2024-11-26 PROCEDURE — 99214 OFFICE O/P EST MOD 30 MIN: CPT | Performed by: INTERNAL MEDICINE

## 2024-11-26 PROCEDURE — 1170F FXNL STATUS ASSESSED: CPT | Performed by: INTERNAL MEDICINE

## 2024-11-26 PROCEDURE — 1159F MED LIST DOCD IN RCRD: CPT | Performed by: INTERNAL MEDICINE

## 2024-11-26 PROCEDURE — 90677 PCV20 VACCINE IM: CPT | Performed by: INTERNAL MEDICINE

## 2024-11-26 PROCEDURE — 99497 ADVNCD CARE PLAN 30 MIN: CPT | Performed by: INTERNAL MEDICINE

## 2024-11-26 PROCEDURE — G2211 COMPLEX E/M VISIT ADD ON: HCPCS | Performed by: INTERNAL MEDICINE

## 2024-11-26 PROCEDURE — 80048 BASIC METABOLIC PNL TOTAL CA: CPT | Performed by: INTERNAL MEDICINE

## 2024-11-26 PROCEDURE — G0444 DEPRESSION SCREEN ANNUAL: HCPCS | Performed by: INTERNAL MEDICINE

## 2024-11-26 PROCEDURE — 1160F RVW MEDS BY RX/DR IN RCRD: CPT | Performed by: INTERNAL MEDICINE

## 2024-11-26 PROCEDURE — G0439 PPPS, SUBSEQ VISIT: HCPCS | Performed by: INTERNAL MEDICINE

## 2024-11-26 PROCEDURE — 84450 TRANSFERASE (AST) (SGOT): CPT | Performed by: INTERNAL MEDICINE

## 2024-11-26 RX ORDER — AMOXICILLIN 500 MG/1
500 CAPSULE ORAL AS NEEDED
Qty: 6 CAPSULE | Refills: 2 | Status: SHIPPED | OUTPATIENT
Start: 2024-11-26

## 2024-11-26 RX ORDER — AMOXICILLIN 500 MG/1
500 CAPSULE ORAL EVERY 8 HOURS SCHEDULED
Qty: 6 CAPSULE | Refills: 2 | Status: SHIPPED | OUTPATIENT
Start: 2024-11-26 | End: 2024-11-26

## 2024-11-26 ASSESSMENT — ANXIETY QUESTIONNAIRES
5. BEING SO RESTLESS THAT IT IS HARD TO SIT STILL: SEVERAL DAYS
4. TROUBLE RELAXING: SEVERAL DAYS
7. FEELING AFRAID AS IF SOMETHING AWFUL MIGHT HAPPEN: NOT AT ALL
3. WORRYING TOO MUCH ABOUT DIFFERENT THINGS: NOT AT ALL
6. BECOMING EASILY ANNOYED OR IRRITABLE: SEVERAL DAYS
GAD7 TOTAL SCORE: 4
1. FEELING NERVOUS, ANXIOUS, OR ON EDGE: NOT AT ALL
2. NOT BEING ABLE TO STOP OR CONTROL WORRYING: SEVERAL DAYS

## 2024-11-26 ASSESSMENT — ACTIVITIES OF DAILY LIVING (ADL)
USING TRANSPORTATION: INDEPENDENT
DRESSING YOURSELF: INDEPENDENT
GROCERY SHOPPING: INDEPENDENT
NEEDS ASSISTANCE WITH FOOD: INDEPENDENT
PILL BOX USED: NO
STIL DRIVING: YES
ADEQUATE_TO_COMPLETE_ADL: YES
HEARING - LEFT EAR: UNABLE TO ASSESS
GROOMING: INDEPENDENT
USING TELEPHONE: INDEPENDENT
FEEDING YOURSELF: INDEPENDENT
BATHING: INDEPENDENT
PREPARING MEALS: INDEPENDENT
TAKING MEDICATION: INDEPENDENT
WALKS IN HOME: INDEPENDENT
JUDGMENT_ADEQUATE_SAFELY_COMPLETE_DAILY_ACTIVITIES: YES
PATIENT'S MEMORY ADEQUATE TO SAFELY COMPLETE DAILY ACTIVITIES?: YES
HEARING - RIGHT EAR: FUNCTIONAL
EATING: INDEPENDENT
DOING HOUSEWORK: INDEPENDENT
MANAGING FINANCES: INDEPENDENT
TOILETING: INDEPENDENT

## 2024-11-26 ASSESSMENT — PATIENT HEALTH QUESTIONNAIRE - PHQ9
2. FEELING DOWN, DEPRESSED OR HOPELESS: NOT AT ALL
1. LITTLE INTEREST OR PLEASURE IN DOING THINGS: NOT AT ALL
SUM OF ALL RESPONSES TO PHQ9 QUESTIONS 1 AND 2: 0

## 2024-11-26 ASSESSMENT — GERIATRIC MINI NUTRITIONAL ASSESSMENT (MNA)
B WEIGHT LOSS DURING THE LAST 3 MONTHS: NO WEIGHT LOSS
A HAS FOOD INTAKE DECLINED OVER THE PAST 3 MONTHS DUE TO LOSS OF APPETITE, DIGESTIVE PROBLEMS, CHEWING OR SWALLOWING DIFFICULTIES?: NO DECREASE IN FOOD INTAKE
E NEUROPSYCHOLOGICAL PROBLEMS: NO PSYCHOLOGICAL PROBLEMS
C GENERAL MOBILITY: GOES OUT
D HAS SUFFERED PSYCHOLOGICAL STRESS OR ACUTE DISEASE IN THE PAST 3 MONTHS?: NO

## 2024-11-26 ASSESSMENT — ENCOUNTER SYMPTOMS
LOSS OF SENSATION IN FEET: 1
DEPRESSION: 0
OCCASIONAL FEELINGS OF UNSTEADINESS: 0

## 2024-11-26 ASSESSMENT — COLUMBIA-SUICIDE SEVERITY RATING SCALE - C-SSRS
2. HAVE YOU ACTUALLY HAD ANY THOUGHTS OF KILLING YOURSELF?: NO
1. IN THE PAST MONTH, HAVE YOU WISHED YOU WERE DEAD OR WISHED YOU COULD GO TO SLEEP AND NOT WAKE UP?: NO
6. HAVE YOU EVER DONE ANYTHING, STARTED TO DO ANYTHING, OR PREPARED TO DO ANYTHING TO END YOUR LIFE?: NO

## 2024-11-26 ASSESSMENT — PAIN SCALES - GENERAL: PAINLEVEL_OUTOF10: 4

## 2024-11-26 NOTE — PROGRESS NOTES
Subjective   Reason for Visit: Elizabet Mitchell is an 79 y.o. female here for a Medicare Wellness visit.   Reviewed all medications by prescribing practitioner or clinical pharmacist (such as prescriptions, OTCs, herbal therapies and supplements) and documented in the medical record.    HPI  Elizabet is 79, she is self-sufficient lives at home reports no falling denies chest pain shortness of breath fever chill nausea vomiting constipation diarrhea dysuria urgency frequency takes medication prescribed.  Patient Care Team:  Scooby Morales MD as PCP - General  Scooby Morales MD as PCP - [facility] Attributed Provider     Review of Systems  10 system review pertinent as above  Objective   Vitals:  /82   Pulse 78   Temp 36.7 °C (98.1 °F) (Temporal)   Resp 14   Ht 1.524 m (5')   Wt 89.8 kg (198 lb)   BMI 38.67 kg/m²       Physical Exam  HEENT: Atraumatic normocephalic the pupils are equal and round and reactive to light the sclerae nonicteric extraocular motion are intact.  Neck: Is supple without JVD no carotid bruits the trachea is midline there are no masses pulses are equal and bilateral with normal upstroke.  Skin: Normal.  Skin good texture.  Moist.  Good turgor.  No lesions, no rashes.  Lymph: No lymphadenopathy appreciated, no masses, no lesions  Lungs: Are clear to auscultation and percussion, good breath sounds bilaterally, no rhonchi, no wheezing, good diaphragmatic excursion.  Heart: Normal rate and normal rhythm S1, S2, no S3, no gallop, murmur or rub.  Abdomen: Soft, nontender, no organomegaly, good bowel sounds.    Extremities: Full range of motion, good pulses bilateral.  No cyanosis, no clubbing or edema.  Neuro: Cranial nerves II-XII are grossly intact there is no sensory or motor deficits.  Able to move all extremities.  Assessment & Plan  Benign essential HTN    Orders:    Basic Metabolic Panel    Primary hypertension         BMI 39.0-39.9,adult    Orders:    CBC w/5 Part Differential, [facility]    Morbid obesity (Multi)    Orders:    CBC w/5 Part Differential, [facility]    Class 2 obesity due to excess calories without serious comorbidity with body mass index (BMI) of 39.0 to 39.9 in adult    Orders:    CBC w/5 Part Differential, [facility]    Dyslipidemia    Orders:    Lipid Panel    AST    ALT    Change in mole    Orders:    Referral to Dermatology    Primary osteoarthritis of hip, unspecified laterality    Orders:    amoxicillin (Amoxil) 500 mg capsule; Take 1 capsule (500 mg) by mouth every 8 hours. Take 3 tablets 1 hour prior to the procedure take 3 tablets 1 hour after procedure

## 2024-11-26 NOTE — ASSESSMENT & PLAN NOTE
Orders:    amoxicillin (Amoxil) 500 mg capsule; Take 1 capsule (500 mg) by mouth every 8 hours. Take 3 tablets 1 hour prior to the procedure take 3 tablets 1 hour after procedure

## 2024-11-26 NOTE — TELEPHONE ENCOUNTER
[facility] called, there are 2 sets of directions on the amoxicillin, please clarify which one is correct.

## 2025-02-14 ENCOUNTER — TELEPHONE (OUTPATIENT)
Dept: PRIMARY CARE | Facility: CLINIC | Age: 80
End: 2025-02-14
Payer: MEDICARE

## 2025-03-18 ENCOUNTER — TELEPHONE (OUTPATIENT)
Dept: PRIMARY CARE | Facility: CLINIC | Age: 80
End: 2025-03-18
Payer: MEDICARE

## 2025-03-18 NOTE — TELEPHONE ENCOUNTER
Please call pt and schedule her an appt for urgent care follow up for shingles, please schedule her for 7-10 days

## 2025-04-01 DIAGNOSIS — I10 BENIGN ESSENTIAL HTN: ICD-10-CM

## 2025-04-02 RX ORDER — LOSARTAN POTASSIUM AND HYDROCHLOROTHIAZIDE 12.5; 5 MG/1; MG/1
1 TABLET ORAL DAILY
Qty: 90 TABLET | Refills: 3 | Status: SHIPPED | OUTPATIENT
Start: 2025-04-02

## 2025-05-08 ENCOUNTER — APPOINTMENT (OUTPATIENT)
Dept: PRIMARY CARE | Facility: CLINIC | Age: 80
End: 2025-05-08
Payer: MEDICARE

## 2025-05-15 ENCOUNTER — APPOINTMENT (OUTPATIENT)
Dept: PRIMARY CARE | Facility: CLINIC | Age: 80
End: 2025-05-15
Payer: MEDICARE

## 2025-06-24 ENCOUNTER — APPOINTMENT (OUTPATIENT)
Dept: PRIMARY CARE | Facility: CLINIC | Age: 80
End: 2025-06-24
Payer: MEDICARE

## 2025-06-24 VITALS
DIASTOLIC BLOOD PRESSURE: 84 MMHG | WEIGHT: 194 LBS | BODY MASS INDEX: 38.09 KG/M2 | HEIGHT: 60 IN | TEMPERATURE: 97.7 F | SYSTOLIC BLOOD PRESSURE: 124 MMHG | HEART RATE: 78 BPM | RESPIRATION RATE: 16 BRPM

## 2025-06-24 DIAGNOSIS — E66.09 CLASS 2 OBESITY DUE TO EXCESS CALORIES WITHOUT SERIOUS COMORBIDITY WITH BODY MASS INDEX (BMI) OF 39.0 TO 39.9 IN ADULT: ICD-10-CM

## 2025-06-24 DIAGNOSIS — E66.812 CLASS 2 OBESITY DUE TO EXCESS CALORIES WITHOUT SERIOUS COMORBIDITY WITH BODY MASS INDEX (BMI) OF 39.0 TO 39.9 IN ADULT: ICD-10-CM

## 2025-06-24 DIAGNOSIS — E55.9 VITAMIN D INSUFFICIENCY: ICD-10-CM

## 2025-06-24 DIAGNOSIS — N18.32 CHRONIC KIDNEY DISEASE, STAGE 3B (MULTI): ICD-10-CM

## 2025-06-24 DIAGNOSIS — Z00.00 ROUTINE GENERAL MEDICAL EXAMINATION AT HEALTH CARE FACILITY: Primary | ICD-10-CM

## 2025-06-24 DIAGNOSIS — H40.9 GLAUCOMA, UNSPECIFIED GLAUCOMA TYPE, UNSPECIFIED LATERALITY: ICD-10-CM

## 2025-06-24 DIAGNOSIS — R79.89 ELEVATED SERUM CREATININE: ICD-10-CM

## 2025-06-24 DIAGNOSIS — E66.01 MORBID OBESITY (MULTI): ICD-10-CM

## 2025-06-24 DIAGNOSIS — I10 BENIGN ESSENTIAL HTN: ICD-10-CM

## 2025-06-24 DIAGNOSIS — B02.9 HERPES ZOSTER WITHOUT COMPLICATION: ICD-10-CM

## 2025-06-24 DIAGNOSIS — M16.10 PRIMARY OSTEOARTHRITIS OF HIP, UNSPECIFIED LATERALITY: ICD-10-CM

## 2025-06-24 DIAGNOSIS — E78.5 DYSLIPIDEMIA: ICD-10-CM

## 2025-06-24 PROCEDURE — 1160F RVW MEDS BY RX/DR IN RCRD: CPT | Performed by: INTERNAL MEDICINE

## 2025-06-24 PROCEDURE — 1123F ACP DISCUSS/DSCN MKR DOCD: CPT | Performed by: INTERNAL MEDICINE

## 2025-06-24 PROCEDURE — G0447 BEHAVIOR COUNSEL OBESITY 15M: HCPCS | Performed by: INTERNAL MEDICINE

## 2025-06-24 PROCEDURE — 82306 VITAMIN D 25 HYDROXY: CPT | Performed by: INTERNAL MEDICINE

## 2025-06-24 PROCEDURE — 80061 LIPID PANEL: CPT | Performed by: INTERNAL MEDICINE

## 2025-06-24 PROCEDURE — 1126F AMNT PAIN NOTED NONE PRSNT: CPT | Performed by: INTERNAL MEDICINE

## 2025-06-24 PROCEDURE — 1158F ADVNC CARE PLAN TLK DOCD: CPT | Performed by: INTERNAL MEDICINE

## 2025-06-24 PROCEDURE — 85025 COMPLETE CBC W/AUTO DIFF WBC: CPT | Performed by: INTERNAL MEDICINE

## 2025-06-24 PROCEDURE — G0444 DEPRESSION SCREEN ANNUAL: HCPCS | Performed by: INTERNAL MEDICINE

## 2025-06-24 PROCEDURE — 3074F SYST BP LT 130 MM HG: CPT | Performed by: INTERNAL MEDICINE

## 2025-06-24 PROCEDURE — G2211 COMPLEX E/M VISIT ADD ON: HCPCS | Performed by: INTERNAL MEDICINE

## 2025-06-24 PROCEDURE — G0442 ANNUAL ALCOHOL SCREEN 15 MIN: HCPCS | Performed by: INTERNAL MEDICINE

## 2025-06-24 PROCEDURE — G0446 INTENS BEHAVE THER CARDIO DX: HCPCS | Performed by: INTERNAL MEDICINE

## 2025-06-24 PROCEDURE — 99214 OFFICE O/P EST MOD 30 MIN: CPT | Performed by: INTERNAL MEDICINE

## 2025-06-24 PROCEDURE — 99497 ADVNCD CARE PLAN 30 MIN: CPT | Performed by: INTERNAL MEDICINE

## 2025-06-24 PROCEDURE — G0439 PPPS, SUBSEQ VISIT: HCPCS | Performed by: INTERNAL MEDICINE

## 2025-06-24 PROCEDURE — 1170F FXNL STATUS ASSESSED: CPT | Performed by: INTERNAL MEDICINE

## 2025-06-24 PROCEDURE — 80048 BASIC METABOLIC PNL TOTAL CA: CPT | Performed by: INTERNAL MEDICINE

## 2025-06-24 PROCEDURE — 1159F MED LIST DOCD IN RCRD: CPT | Performed by: INTERNAL MEDICINE

## 2025-06-24 PROCEDURE — 1036F TOBACCO NON-USER: CPT | Performed by: INTERNAL MEDICINE

## 2025-06-24 PROCEDURE — 3079F DIAST BP 80-89 MM HG: CPT | Performed by: INTERNAL MEDICINE

## 2025-06-24 RX ORDER — TIMOLOL MALEATE 5 MG/ML
1 SOLUTION/ DROPS OPHTHALMIC 2 TIMES DAILY
Start: 2025-06-24

## 2025-06-24 RX ORDER — TRAVOPROST OPHTHALMIC SOLUTION 0.04 MG/ML
1 SOLUTION OPHTHALMIC NIGHTLY
Start: 2025-06-24

## 2025-06-24 RX ORDER — AMOXICILLIN 500 MG/1
500 CAPSULE ORAL AS NEEDED
Qty: 6 CAPSULE | Refills: 2 | Status: SHIPPED | OUTPATIENT
Start: 2025-06-24

## 2025-06-24 ASSESSMENT — ANXIETY QUESTIONNAIRES
2. NOT BEING ABLE TO STOP OR CONTROL WORRYING: NOT AT ALL
5. BEING SO RESTLESS THAT IT IS HARD TO SIT STILL: NOT AT ALL
4. TROUBLE RELAXING: NOT AT ALL
IF YOU CHECKED OFF ANY PROBLEMS ON THIS QUESTIONNAIRE, HOW DIFFICULT HAVE THESE PROBLEMS MADE IT FOR YOU TO DO YOUR WORK, TAKE CARE OF THINGS AT HOME, OR GET ALONG WITH OTHER PEOPLE: NOT DIFFICULT AT ALL
1. FEELING NERVOUS, ANXIOUS, OR ON EDGE: NOT AT ALL
GAD7 TOTAL SCORE: 0
3. WORRYING TOO MUCH ABOUT DIFFERENT THINGS: NOT AT ALL
6. BECOMING EASILY ANNOYED OR IRRITABLE: NOT AT ALL
7. FEELING AFRAID AS IF SOMETHING AWFUL MIGHT HAPPEN: NOT AT ALL

## 2025-06-24 ASSESSMENT — COLUMBIA-SUICIDE SEVERITY RATING SCALE - C-SSRS
6. HAVE YOU EVER DONE ANYTHING, STARTED TO DO ANYTHING, OR PREPARED TO DO ANYTHING TO END YOUR LIFE?: NO
1. IN THE PAST MONTH, HAVE YOU WISHED YOU WERE DEAD OR WISHED YOU COULD GO TO SLEEP AND NOT WAKE UP?: NO
2. HAVE YOU ACTUALLY HAD ANY THOUGHTS OF KILLING YOURSELF?: NO

## 2025-06-24 ASSESSMENT — ENCOUNTER SYMPTOMS
LOSS OF SENSATION IN FEET: 0
DEPRESSION: 0
OCCASIONAL FEELINGS OF UNSTEADINESS: 0

## 2025-06-24 ASSESSMENT — ACTIVITIES OF DAILY LIVING (ADL)
EATING: INDEPENDENT
HEARING - LEFT EAR: FUNCTIONAL
STIL DRIVING: NO
JUDGMENT_ADEQUATE_SAFELY_COMPLETE_DAILY_ACTIVITIES: YES
FEEDING YOURSELF: INDEPENDENT
WALKS IN HOME: INDEPENDENT
MANAGING FINANCES: INDEPENDENT
USING TRANSPORTATION: INDEPENDENT
ADEQUATE_TO_COMPLETE_ADL: YES
PATIENT'S MEMORY ADEQUATE TO SAFELY COMPLETE DAILY ACTIVITIES?: YES
PREPARING MEALS: INDEPENDENT
BATHING: INDEPENDENT
GROCERY SHOPPING: INDEPENDENT
GROOMING: INDEPENDENT
TOILETING: INDEPENDENT
HEARING - RIGHT EAR: FUNCTIONAL
TAKING MEDICATION: INDEPENDENT
USING TELEPHONE: INDEPENDENT
NEEDS ASSISTANCE WITH FOOD: INDEPENDENT
DRESSING YOURSELF: INDEPENDENT
DOING HOUSEWORK: INDEPENDENT
PILL BOX USED: NO

## 2025-06-24 ASSESSMENT — GERIATRIC MINI NUTRITIONAL ASSESSMENT (MNA)
B WEIGHT LOSS DURING THE LAST 3 MONTHS: NO WEIGHT LOSS
E NEUROPSYCHOLOGICAL PROBLEMS: NO PSYCHOLOGICAL PROBLEMS
D HAS SUFFERED PSYCHOLOGICAL STRESS OR ACUTE DISEASE IN THE PAST 3 MONTHS?: NO
A HAS FOOD INTAKE DECLINED OVER THE PAST 3 MONTHS DUE TO LOSS OF APPETITE, DIGESTIVE PROBLEMS, CHEWING OR SWALLOWING DIFFICULTIES?: NO DECREASE IN FOOD INTAKE
C GENERAL MOBILITY: GOES OUT

## 2025-06-24 ASSESSMENT — PATIENT HEALTH QUESTIONNAIRE - PHQ9
1. LITTLE INTEREST OR PLEASURE IN DOING THINGS: NOT AT ALL
SUM OF ALL RESPONSES TO PHQ9 QUESTIONS 1 AND 2: 0
2. FEELING DOWN, DEPRESSED OR HOPELESS: NOT AT ALL

## 2025-06-24 ASSESSMENT — PAIN SCALES - GENERAL: PAINLEVEL_OUTOF10: 0-NO PAIN

## 2025-06-24 NOTE — PROGRESS NOTES
Subjective   Reason for Visit: Elizabet Mitchell is an 79 y.o. female here for a Medicare Wellness visit.   Reviewed all medications by prescribing practitioner or clinical pharmacist (such as prescriptions, OTCs, herbal therapies and supplements) and documented in the medical record.    HPI  Patient is here for Medicare wellness she was no major medical care chest pain shortness breath fever chill nausea vomit constipation diarrhea dysuria urgency frequency.  Patient Care Team:  Scooby Morales MD as PCP - General  Scooby Morales MD as PCP - [facility] Attributed Provider     Review of Systems    Objective   Vitals:  /84   Pulse 78   Temp 36.5 °C (97.7 °F)   Resp 16   Ht 1.524 m (5')   Wt 88 kg (194 lb)   BMI 37.89 kg/m²       Physical Exam  HEENT: Atraumatic normocephalic the pupils are equal and round and reactive to light the sclerae nonicteric extraocular motion are intact.  Neck: Is supple without JVD no carotid bruits the trachea is midline there are no masses pulses are equal and bilateral with normal upstroke.  Skin: Normal.  Skin good texture.  Moist.  Good turgor.  No lesions, no rashes.  Lymph: No lymphadenopathy appreciated, no masses, no lesions  Lungs: Are clear to auscultation and percussion, good breath sounds bilaterally, no rhonchi, no wheezing, good diaphragmatic excursion.  Heart: Normal rate and normal rhythm S1, S2, no S3, no gallop, murmur or rub.  Abdomen: Soft, nontender, no organomegaly, good bowel sounds.    Extremities: Full range of motion, good pulses bilateral.  No cyanosis, no clubbing or edema.  Neuro: Cranial nerves II-XII are grossly intact there is no sensory or motor deficits.  Able to move all extremities.  Assessment & Plan  Glaucoma, unspecified glaucoma type, unspecified laterality    Orders:    timolol (Timoptic) 0.5 % ophthalmic solution; Administer 1 drop into the left eye 2 times a day.    travoprost (Travatan Z) 0.004 % drops ophthalmic solution; Administer 1 drop  into the left eye once daily at bedtime.    CBC w/5 Part Differential, [facility]    Benign essential HTN    Orders:    Basic Metabolic Panel    BMI 39.0-39.9,adult         Class 2 obesity due to excess calories without serious comorbidity with body mass index (BMI) of 39.0 to 39.9 in adult         Elevated serum creatinine         Herpes zoster without complication    Orders:    CBC w/5 Part Differential, [facility]    Primary osteoarthritis of hip, unspecified laterality    Orders:    amoxicillin (Amoxil) 500 mg capsule; Take 1 capsule (500 mg) by mouth if needed (Take 3 tablets 1 hour prior to the procedure and 3 tablets 1 hour after the procedure). Take 3 tablets 1 hour prior to the procedure take 3 tablets 1 hour after procedure    Dyslipidemia    Orders:    CBC w/5 Part Differential, [facility]    Lipid Panel    AST    ALT    Vitamin D insufficiency    Orders:    Vitamin D 25-Hydroxy,Total (for eval of Vitamin D levels)    Routine general medical examination at health care facility    Orders:    1 Year Follow Up In Primary Care - Wellness Exam; Future    Chronic kidney disease, stage 3b (Multi)         Morbid obesity (Multi)         Medicare wellness

## 2025-06-24 NOTE — ASSESSMENT & PLAN NOTE
Orders:    amoxicillin (Amoxil) 500 mg capsule; Take 1 capsule (500 mg) by mouth if needed (Take 3 tablets 1 hour prior to the procedure and 3 tablets 1 hour after the procedure). Take 3 tablets 1 hour prior to the procedure take 3 tablets 1 hour after procedure

## 2025-06-24 NOTE — ASSESSMENT & PLAN NOTE
Orders:    timolol (Timoptic) 0.5 % ophthalmic solution; Administer 1 drop into the left eye 2 times a day.    travoprost (Travatan Z) 0.004 % drops ophthalmic solution; Administer 1 drop into the left eye once daily at bedtime.    CBC w/5 Part Differential, [facility]

## 2025-06-25 LAB
25(OH)D3 SERPL-MCNC: 43 NG/ML (ref 30–100)
ALT SERPL W P-5'-P-CCNC: 24 U/L (ref 16–63)
ANION GAP SERPL CALC-SCNC: 19 MMOL/L (ref 10–20)
AST SERPL W P-5'-P-CCNC: 21 U/L (ref 15–37)
BASOPHILS # BLD AUTO: 0.02 X10*3/UL (ref 0.1–1.6)
BASOPHILS NFR BLD AUTO: 0.19 % (ref 0–0.3)
BUN SERPL-MCNC: 37 MG/DL (ref 7–18)
CALCIUM SERPL-MCNC: 9.8 MG/DL (ref 8.5–10.1)
CHLORIDE SERPL-SCNC: 102 MMOL/L (ref 98–107)
CHOLEST SERPL-MCNC: 190 MG/DL (ref 0–199)
CHOLESTEROL/HDL RATIO: 3.1 (ref 4.2–7)
CO2 SERPL-SCNC: 21 MMOL/L (ref 21–32)
CREAT SERPL-MCNC: 1.75 MG/DL (ref 0.6–1.1)
EGFRCR SERPLBLD CKD-EPI 2021: 30 ML/MIN/1.73M*2
EOSINOPHIL # BLD AUTO: 0.32 X10*3/UL (ref 0.04–0.5)
EOSINOPHIL NFR BLD AUTO: 3.29 % (ref 0.7–7)
ERYTHROCYTE [DISTWIDTH] IN BLOOD BY AUTOMATED COUNT: 14.3 % (ref 11.5–14.5)
GLUCOSE SERPL-MCNC: 81 MG/DL (ref 74–100)
HCT VFR BLD AUTO: 41.8 % (ref 36.6–46.6)
HDLC SERPL-MCNC: 62 MG/DL (ref 40–59)
HGB BLD-MCNC: 14.51 G/DL (ref 12–15.4)
IS PATIENT FASTING: YES
LDLC SERPL DIRECT ASSAY-MCNC: 114 MG/DL (ref 0–100)
LYMPHOCYTES # BLD AUTO: 1.98 X10*3/UL (ref 0–6)
LYMPHOCYTES NFR BLD AUTO: 20.05 % (ref 20.5–51.1)
MCH RBC QN AUTO: 32.1 PG (ref 26–32)
MCHC RBC AUTO-ENTMCNC: 34.7 G/DL (ref 31–38)
MCV RBC AUTO: 92.4 FL (ref 80–96)
MONOCYTES # BLD AUTO: 0.66 X10*3/UL (ref 1.6–24.9)
MONOCYTES NFR BLD AUTO: 6.68 % (ref 1.7–9.3)
NEUTROPHILS # BLD AUTO: 6.89 X10*3/UL (ref 1.4–6.5)
NEUTROPHILS NFR BLD AUTO: 69.79 % (ref 42.2–75.2)
PLATELET # BLD AUTO: 424.1 X10*3/UL (ref 150–450)
PMV BLD AUTO: 8 FL (ref 7.8–11)
POTASSIUM SERPL-SCNC: 4.9 MMOL/L (ref 3.5–5.1)
RBC # BLD AUTO: 4.52 X10*6/UL (ref 3.9–5.3)
SODIUM SERPL-SCNC: 137 MMOL/L (ref 136–145)
TRIGL SERPL-MCNC: 72 MG/DL
WBC # BLD AUTO: 9.87 X10*3/UL (ref 4.5–10.5)

## 2025-08-04 ENCOUNTER — TELEMEDICINE (OUTPATIENT)
Dept: PRIMARY CARE | Facility: CLINIC | Age: 80
End: 2025-08-04
Payer: MEDICARE

## 2025-08-04 DIAGNOSIS — J10.1 INFLUENZA A: ICD-10-CM

## 2025-08-04 DIAGNOSIS — B34.9 VIRAL ILLNESS: Primary | ICD-10-CM

## 2025-08-04 PROCEDURE — 99213 OFFICE O/P EST LOW 20 MIN: CPT | Performed by: INTERNAL MEDICINE

## 2025-08-04 NOTE — PROGRESS NOTES
Subjective   Elizabet Mitchell is a 80 y.o. female in for telephone visit  Virtual or Telephone Consent    An interactive audio and video telecommunication system which permits real time communications between the patient (at the originating site) and provider (at the distant site) was utilized to provide this telehealth service.   Verbal consent was requested and obtained from Elizabet Mitchell on this date, 08/04/25 for a telehealth visit and the patient's location was confirmed at the time of the visit.   ADI Peñaloza is 80-year-old female with known history of dyslipidemia hypertension elevated BMI patient is here for a follow-up, voices no major medical pain here for fasting blood work, denies chest pain shortness of breath fever chills nausea vomiting constipation diarrhea dysuria urgency frequency.  Patient complaining of fever chills and bodyaches, nausea and vomiting, started about 4 days prior, by today patient is feeling much better she still somewhat fatigued and tired.      Review of Systems  10 system review pertinent as above  Objective   Telephone visit  There were no vitals taken for this visit.     Physical Exam  Telephone visit    Assessment/Plan     Telephone visit  Viral illness  Suspicion for influenza  Now feeling better  No fever chills or body  But postviral syndrome  With fatigue weakness lack of energy  I have instructed patient to replenish her liquids  Electrolytes replacement soup rice toast  There is no signs of symptoms of infection  I.e. no cough no sinus congestion no sore throat  Hopefully in 2 to 3 days patient will start feeling better  She will let me know if not    Viral illness    Patient is here for follow-up fasting blood work    CBC BMP lipids AST ALT vitamin D 25-hydroxy    Prevention  Mammogram cont Declined (05/03/24 (11/26/24) (06/24/25)  Colonoscopy Cont Declined (05/03/24)(11/26/24)(06/24/25)    Immunization (06/24/25)  Flu vaccine Declined  Pneumovax 11/26/24   Patient  wishes No other vaccines   [place] summer 02/2023  Shingles sep 2025 (planning)  One dark mole circular and symmetric rt upper arm  Will refer to dermatology  Patient wants to see her own dermatologist Dr. Shannon    Hypertension  No added salt diet, do not and salt to your food  Try to exercise every other day for 30 minutes  Continue current medications    Continue with the low-fat, low-cholesterol diet,  I recommended Mediterranean diet, which include fish, chicken, vegetables and olive oil  Exercise daily for 30 minutes at least 3 times a week  Continue home medications    Elevated BMI 38.67 kg/meter sq  Low-fat, low-cholesterol diet, exercise, daily  Ideal BMI is between 23 and 26 kg/m²  Low carbohydrate diet.    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

## 2025-08-08 ENCOUNTER — TELEPHONE (OUTPATIENT)
Dept: PRIMARY CARE | Facility: CLINIC | Age: 80
End: 2025-08-08
Payer: MEDICARE

## 2025-08-08 NOTE — TELEPHONE ENCOUNTER
Elizabet called to let you know that she still have lack of energy, lack of appetite and her stomach do not hurt or have any symptoms but do not feel 100%.... she would like to know what can she do?     Pharmacy: [facility] in [facility]

## 2025-09-02 ENCOUNTER — APPOINTMENT (OUTPATIENT)
Dept: PRIMARY CARE | Facility: CLINIC | Age: 80
End: 2025-09-02
Payer: MEDICARE

## 2025-10-06 ENCOUNTER — APPOINTMENT (OUTPATIENT)
Dept: PRIMARY CARE | Facility: CLINIC | Age: 80
End: 2025-10-06
Payer: MEDICARE

## 2026-06-23 ENCOUNTER — APPOINTMENT (OUTPATIENT)
Dept: PRIMARY CARE | Facility: CLINIC | Age: 81
End: 2026-06-23
Payer: MEDICARE